# Patient Record
(demographics unavailable — no encounter records)

---

## 2024-12-31 NOTE — REASON FOR VISIT
[Initial Evaluation] : an initial evaluation for [FreeTextEntry2] : Recurrent otitis and hearing loss

## 2024-12-31 NOTE — END OF VISIT
[FreeTextEntry3] :  I, Dr. Zhu, personally performed the evaluation and management (E/M) services for this new patient.  That E/M includes conducting the initial examination, assessing all conditions, and establishing the plan of care.  Today, my physician assistant, Oksana Mata, was here to observe my evaluation and management services for this patient to be followed going forward.

## 2024-12-31 NOTE — HISTORY OF PRESENT ILLNESS
[de-identified] : 72 y/o M with h/o recurrent cerumen impaction p/w congestion of b ears for the past year. He states that he gets wax buildup every x2-3 months. He has h/o recurrent ear infections and hearing loss. He has h/o M&T of left ear.

## 2024-12-31 NOTE — PROCEDURE
[Cerumen Impaction] : Cerumen Impaction [Same] : same as the Pre Op Dx. [] : Removal of Cerumen [FreeTextEntry6] : r copious cerumen noted, removed atraumatically with suction, b tm intact

## 2024-12-31 NOTE — HISTORY OF PRESENT ILLNESS
[de-identified] : 74 y/o M with h/o recurrent cerumen impaction p/w congestion of b ears for the past year. He states that he gets wax buildup every x2-3 months. He has h/o recurrent ear infections and hearing loss. He has h/o M&T of left ear.

## 2024-12-31 NOTE — PHYSICAL EXAM
[Midline] : trachea located in midline position [Normal] : assessment of respiratory effort is normal [de-identified] : r copious cerumen noted, removed atraumatically with suction, b tm intact

## 2024-12-31 NOTE — ASSESSMENT
[FreeTextEntry1] : 1. cerumen impaction, r  -r copious cerumen noted, removed atraumatically with suction, b tm intact -ear felt better -avoid qtip use  2. hearing loss -audio exam  RTC in x6 months

## 2024-12-31 NOTE — PHYSICAL EXAM
[Midline] : trachea located in midline position [Normal] : assessment of respiratory effort is normal [de-identified] : r copious cerumen noted, removed atraumatically with suction, b tm intact

## 2025-01-06 NOTE — HISTORY OF PRESENT ILLNESS
[de-identified] : 72yo M here today for 2 week F/u on recurrent otitis & hearing loss. Hx M&T of L ear, cerumen impaction, recurrent otitis. Cerumen removed R & audiology test ordered at last visit.

## 2025-01-06 NOTE — REASON FOR VISIT
[Subsequent Evaluation] : a subsequent evaluation for [FreeTextEntry2] : recurrent otitis, hearing loss, & cerumen impaction

## 2025-01-06 NOTE — HISTORY OF PRESENT ILLNESS
[de-identified] : 72yo M here today for 2 week F/u on recurrent otitis & hearing loss. Hx M&T of L ear, cerumen impaction, recurrent otitis. Cerumen removed R & audiology test ordered at last visit.